# Patient Record
(demographics unavailable — no encounter records)

---

## 2025-06-23 NOTE — PHYSICAL EXAM
[No Acute Distress] : no acute distress [Normal Rate/Rhythm] : normal rate/rhythm [Normal S1, S2] : normal s1, s2 [No Murmurs] : no murmurs [No Resp Distress] : no resp distress [No Acc Muscle Use] : no acc muscle use [Clear to Auscultation Bilaterally] : clear to auscultation bilaterally [No Clubbing] : no clubbing [No Cyanosis] : no cyanosis [No Edema] : no edema [No Focal Deficits] : no focal deficits [Oriented x3] : oriented x3 [Normal Affect] : normal affect [TextBox_68] : cough with deep inspiration with expiratory wheeze during coughing only

## 2025-06-23 NOTE — HISTORY OF PRESENT ILLNESS
[Never] : never [TextBox_4] :  Ms. DOROTHEA HENRY is a 35 year old F here for evaluation of cough.  She developed a new cough about 1 month ago. No preceding viral URI or illnesses prior to cough onset. She has a h/o seasonal allergies and usually gets postnasal drip around this time but has not had an associated cough before. Cough is usually dry but occasionally productive of white/clear sputum. Then about 2 weeks ago she developed shortness of breath with exertion and sensation of chest tightness. She felt like she could not take a full deep breath. She went to  and then her PCP; she was told it was likely seasonal allergies and instructed to take OTC antihistamines and given an albuterol inhaler. She felt no significant relief after using albuterol. She does not have wheezing or nocturnal worsening of her symptoms.  She has never smoked. She previously had a lap band which led to some weight loss but then gained some after she had her 2 children via . No complications with prior surgeries

## 2025-06-23 NOTE — DISCUSSION/SUMMARY
[FreeTextEntry1] : 35F here for evaluation of new persistent cough  #Cough  - No imaging to review - Discussed common etiologies of cough including postnasal drip 2/2 seasonal allergies vs silent GERD vs airways disease - At this time, would want to rule out asthma given her symptoms are suggestive - PFTs/FeNO ordered - Will start empiric ICS inhaler and counseled on inhaler technique

## 2025-07-11 NOTE — PHYSICAL EXAM
[Alert] : alert [Obese (BMI >= 30)] : obese (BMI >= 30) [Sclera] : the sclera and conjunctiva were normal [Hearing Threshold Finger Rub Not [place]] : hearing was normal [Normal Appearance] : the appearance of the neck was normal [No Respiratory Distress] : no respiratory distress [Heart Rate And Rhythm] : heart rate was normal and rhythm regular [Bowel Sounds] : normal bowel sounds [Abdomen Tenderness] : non-tender [No Masses] : no abdominal mass palpated [Abdomen Soft] : soft [No Focal Deficits] : no focal deficits [Oriented To Time, Place, And Person] : oriented to person, place, and time [de-identified] : Surgical scars noted

## 2025-07-11 NOTE — ASSESSMENT
[FreeTextEntry1] : # RUQ pain - pain characteristics and history are suggestive of symptomatic cholelithiasis. Low suspicion for PUD or heartburn given no relation to meals, no relief with TUMS and timing and quality of pain. Suspect patient will benefit from elective cholecystectomy. Referral provided so that patient can discuss with a surgeon. Discussed red flags for urgent medical care including fever, jaundice and intractable abdominal pain. Discussed diagnostic uncertainty with symptomatic cholelithiasis and possibility that pain will not resolve after a cholecystectomy.  # H/o lap band - patient to f/u with her surgeon given weight gain and occasional vomiting episodes.  Plan: - surgery referral provided (Dr. Ortega) - f/u in 2 months - f/u with bariatric surgery

## 2025-07-11 NOTE — HISTORY OF PRESENT ILLNESS
[FreeTextEntry1] : Ms. Syed is a 36yo F with PMH of obesity, s/p lap band (~2020) who presents for RUQ abdominal pain.  Patient reports over a year of intermittent RUQ pain, described as dull, persistent, severe non radiating RUQ pain that lasts for several hours at a time. Symptoms tend to happen at night and not directly related to meals. No relation to defecation. No fevers, no jaundice. Symptoms happen randomly, about once every few months. No dysphagia or odynophagia. No heartburn or reflux. No melena. No diarrhea or constipation. Reports both mother and sister had symptomatic cholelithiasis. No relief with TUMS.  Patient went to the ED 5/25 for these complaints. Labs including CMP. lipase and CBC were unrevealing. RUQ US: Contracted gallbladder with a gallstone in the neck. Mild wall thickening which can be seen secondary to underdistention however, the patient exhibited a positive sonographic Davidson's sign. These findings may represent chronic cholecystitis given the lack of gallbladder distention. Recommend HIDA scan for further evaluation.    HIDA: Somewhat equivocal study possibly representing delayed visualization of tracer activity  in a contracted gallbladder. Acute cholecystitis is doubted. No evidence of common bile duct obstruction.   Patient reports rare episodes of nausea and vomiting when she over-eats, attributed to her lap band. No recent follow up with her surgeon. Reports significant weight gain.